# Patient Record
Sex: FEMALE | Race: WHITE | NOT HISPANIC OR LATINO | Employment: FULL TIME | URBAN - METROPOLITAN AREA
[De-identification: names, ages, dates, MRNs, and addresses within clinical notes are randomized per-mention and may not be internally consistent; named-entity substitution may affect disease eponyms.]

---

## 2023-10-21 PROBLEM — F32.A DEPRESSION: Status: ACTIVE | Noted: 2023-10-21

## 2023-10-21 PROBLEM — M54.42 ACUTE MIDLINE LOW BACK PAIN WITH BILATERAL SCIATICA: Status: ACTIVE | Noted: 2023-10-21

## 2023-10-21 PROBLEM — T78.40XA ALLERGIC REACTION TO DRUG: Status: ACTIVE | Noted: 2023-10-21

## 2023-10-21 PROBLEM — M54.41 ACUTE MIDLINE LOW BACK PAIN WITH BILATERAL SCIATICA: Status: ACTIVE | Noted: 2023-10-21

## 2023-10-21 PROBLEM — M25.69 DECREASED RANGE OF MOTION OF TRUNK AND BACK: Status: ACTIVE | Noted: 2023-10-21

## 2023-10-21 RX ORDER — SERTRALINE HYDROCHLORIDE 100 MG/1
1 TABLET, FILM COATED ORAL DAILY
COMMUNITY
Start: 2020-05-17 | End: 2023-10-24 | Stop reason: SDUPTHER

## 2023-10-21 RX ORDER — LEVONORGESTREL 52 MG/1
INTRAUTERINE DEVICE INTRAUTERINE
COMMUNITY
Start: 2019-07-26

## 2023-10-24 ENCOUNTER — OFFICE VISIT (OUTPATIENT)
Dept: OBSTETRICS AND GYNECOLOGY | Facility: CLINIC | Age: 33
End: 2023-10-24
Payer: COMMERCIAL

## 2023-10-24 VITALS
WEIGHT: 243 LBS | BODY MASS INDEX: 41.48 KG/M2 | SYSTOLIC BLOOD PRESSURE: 154 MMHG | HEIGHT: 64 IN | DIASTOLIC BLOOD PRESSURE: 80 MMHG

## 2023-10-24 DIAGNOSIS — F41.8 DEPRESSION WITH ANXIETY: Primary | ICD-10-CM

## 2023-10-24 DIAGNOSIS — Z01.419 WOMEN'S ANNUAL ROUTINE GYNECOLOGICAL EXAMINATION: ICD-10-CM

## 2023-10-24 PROCEDURE — 1036F TOBACCO NON-USER: CPT | Performed by: NURSE PRACTITIONER

## 2023-10-24 PROCEDURE — 99395 PREV VISIT EST AGE 18-39: CPT | Performed by: NURSE PRACTITIONER

## 2023-10-24 RX ORDER — SERTRALINE HYDROCHLORIDE 100 MG/1
100 TABLET, FILM COATED ORAL DAILY
Qty: 90 TABLET | Refills: 3 | Status: SHIPPED | OUTPATIENT
Start: 2023-10-24 | End: 2024-10-23

## 2023-10-24 ASSESSMENT — ENCOUNTER SYMPTOMS
ENDOCRINE NEGATIVE: 1
PSYCHIATRIC NEGATIVE: 1
CONSTITUTIONAL NEGATIVE: 1
MUSCULOSKELETAL NEGATIVE: 1
RESPIRATORY NEGATIVE: 1
CARDIOVASCULAR NEGATIVE: 1
GASTROINTESTINAL NEGATIVE: 1
EYES NEGATIVE: 1
NEUROLOGICAL NEGATIVE: 1

## 2023-10-24 NOTE — PROGRESS NOTES
Subjective   Patient ID: Criselda Lam is a 33 y.o. female who presents for Annual Exam (Reviewing EMR indicates normal PAP 6/23/21/Mirena was inserted 7/26/2019).  33 year old here for annual exam without complaints.  She had a normal pap in 2021.  She denies any need for std testing.  She denies any discharge, abnormal bleeding, urinary changes and breast complaints.  She has the mirena IUD which was inserted in 2019.  She is currently using zoloft and desires refills.  She is doing well with the current dosage and desires to continue.         Review of Systems   Constitutional: Negative.    HENT: Negative.     Eyes: Negative.    Respiratory: Negative.     Cardiovascular: Negative.    Gastrointestinal: Negative.    Endocrine: Negative.    Genitourinary: Negative.    Musculoskeletal: Negative.    Skin: Negative.    Neurological: Negative.    Psychiatric/Behavioral: Negative.         Objective   Physical Exam  Vitals reviewed.   Constitutional:       Appearance: Normal appearance. She is well-developed.   Pulmonary:      Effort: Pulmonary effort is normal. No respiratory distress.   Chest:   Breasts:     Breasts are symmetrical.      Right: Normal. No swelling, bleeding, inverted nipple, mass, nipple discharge, skin change or tenderness.      Left: Normal. No swelling, bleeding, inverted nipple, mass, nipple discharge, skin change or tenderness.   Abdominal:      Palpations: Abdomen is soft.   Genitourinary:     General: Normal vulva.      Exam position: Lithotomy position.      Pubic Area: No rash.       Labia:         Right: No rash, tenderness, lesion or injury.         Left: No rash, tenderness, lesion or injury.       Urethra: No prolapse, urethral pain, urethral swelling or urethral lesion.      Vagina: Normal.      Cervix: Normal.      Uterus: Normal.       Adnexa: Right adnexa normal and left adnexa normal.      Rectum: Normal.   Musculoskeletal:         General: Normal range of motion.   Lymphadenopathy:       Upper Body:      Right upper body: No supraclavicular, axillary or pectoral adenopathy.      Left upper body: No supraclavicular, axillary or pectoral adenopathy.   Skin:     General: Skin is warm and dry.   Neurological:      General: No focal deficit present.      Mental Status: She is alert and oriented to person, place, and time. Mental status is at baseline.   Psychiatric:         Attention and Perception: Attention and perception normal.         Mood and Affect: Mood and affect normal.         Speech: Speech normal.         Behavior: Behavior normal. Behavior is cooperative.         Thought Content: Thought content normal.         Judgment: Judgment normal.         Assessment/Plan   Problem List Items Addressed This Visit             ICD-10-CM    Women's annual routine gynecological examination Z01.419     Other Visit Diagnoses         Codes    Depression with anxiety    -  Primary F41.8    Relevant Medications    sertraline (Zoloft) 100 mg tablet          Pap/hpv due 2024  Declined std testing  Zoloft ordered with refills x 1 year  Follow up 1 year or as needed

## 2024-05-13 ENCOUNTER — APPOINTMENT (OUTPATIENT)
Dept: RADIOLOGY | Facility: HOSPITAL | Age: 34
End: 2024-05-13
Payer: COMMERCIAL

## 2024-05-13 ENCOUNTER — HOSPITAL ENCOUNTER (EMERGENCY)
Facility: HOSPITAL | Age: 34
Discharge: HOME | End: 2024-05-13
Attending: STUDENT IN AN ORGANIZED HEALTH CARE EDUCATION/TRAINING PROGRAM
Payer: COMMERCIAL

## 2024-05-13 ENCOUNTER — APPOINTMENT (OUTPATIENT)
Dept: CARDIOLOGY | Facility: HOSPITAL | Age: 34
End: 2024-05-13
Payer: COMMERCIAL

## 2024-05-13 VITALS
BODY MASS INDEX: 39.27 KG/M2 | OXYGEN SATURATION: 98 % | TEMPERATURE: 97.9 F | SYSTOLIC BLOOD PRESSURE: 138 MMHG | WEIGHT: 230 LBS | HEIGHT: 64 IN | DIASTOLIC BLOOD PRESSURE: 75 MMHG | RESPIRATION RATE: 12 BRPM | HEART RATE: 77 BPM

## 2024-05-13 DIAGNOSIS — R07.9 CHEST PAIN, UNSPECIFIED TYPE: Primary | ICD-10-CM

## 2024-05-13 LAB
ALBUMIN SERPL BCP-MCNC: 4.5 G/DL (ref 3.4–5)
ALP SERPL-CCNC: 84 U/L (ref 33–110)
ALT SERPL W P-5'-P-CCNC: 23 U/L (ref 7–45)
ANION GAP SERPL CALC-SCNC: 14 MMOL/L (ref 10–20)
AST SERPL W P-5'-P-CCNC: 20 U/L (ref 9–39)
BASOPHILS # BLD AUTO: 0.05 X10*3/UL (ref 0–0.1)
BASOPHILS NFR BLD AUTO: 0.6 %
BILIRUB SERPL-MCNC: 0.5 MG/DL (ref 0–1.2)
BUN SERPL-MCNC: 13 MG/DL (ref 6–23)
CALCIUM SERPL-MCNC: 9.6 MG/DL (ref 8.6–10.3)
CARDIAC TROPONIN I PNL SERPL HS: 3 NG/L (ref 0–13)
CHLORIDE SERPL-SCNC: 104 MMOL/L (ref 98–107)
CO2 SERPL-SCNC: 25 MMOL/L (ref 21–32)
CREAT SERPL-MCNC: 0.77 MG/DL (ref 0.5–1.05)
EGFRCR SERPLBLD CKD-EPI 2021: >90 ML/MIN/1.73M*2
EOSINOPHIL # BLD AUTO: 0.6 X10*3/UL (ref 0–0.7)
EOSINOPHIL NFR BLD AUTO: 7.6 %
ERYTHROCYTE [DISTWIDTH] IN BLOOD BY AUTOMATED COUNT: 12 % (ref 11.5–14.5)
GLUCOSE SERPL-MCNC: 104 MG/DL (ref 74–99)
HCG UR QL IA.RAPID: NEGATIVE
HCT VFR BLD AUTO: 45.8 % (ref 36–46)
HGB BLD-MCNC: 16.4 G/DL (ref 12–16)
IMM GRANULOCYTES # BLD AUTO: 0.03 X10*3/UL (ref 0–0.7)
IMM GRANULOCYTES NFR BLD AUTO: 0.4 % (ref 0–0.9)
LYMPHOCYTES # BLD AUTO: 2.43 X10*3/UL (ref 1.2–4.8)
LYMPHOCYTES NFR BLD AUTO: 30.8 %
MAGNESIUM SERPL-MCNC: 1.92 MG/DL (ref 1.6–2.4)
MCH RBC QN AUTO: 31.5 PG (ref 26–34)
MCHC RBC AUTO-ENTMCNC: 35.8 G/DL (ref 32–36)
MCV RBC AUTO: 88 FL (ref 80–100)
MONOCYTES # BLD AUTO: 0.52 X10*3/UL (ref 0.1–1)
MONOCYTES NFR BLD AUTO: 6.6 %
NEUTROPHILS # BLD AUTO: 4.27 X10*3/UL (ref 1.2–7.7)
NEUTROPHILS NFR BLD AUTO: 54 %
NRBC BLD-RTO: 0 /100 WBCS (ref 0–0)
PLATELET # BLD AUTO: 313 X10*3/UL (ref 150–450)
POTASSIUM SERPL-SCNC: 3.6 MMOL/L (ref 3.5–5.3)
PROT SERPL-MCNC: 7.9 G/DL (ref 6.4–8.2)
RBC # BLD AUTO: 5.2 X10*6/UL (ref 4–5.2)
SODIUM SERPL-SCNC: 139 MMOL/L (ref 136–145)
WBC # BLD AUTO: 7.9 X10*3/UL (ref 4.4–11.3)

## 2024-05-13 PROCEDURE — 36415 COLL VENOUS BLD VENIPUNCTURE: CPT | Performed by: STUDENT IN AN ORGANIZED HEALTH CARE EDUCATION/TRAINING PROGRAM

## 2024-05-13 PROCEDURE — 84075 ASSAY ALKALINE PHOSPHATASE: CPT | Performed by: STUDENT IN AN ORGANIZED HEALTH CARE EDUCATION/TRAINING PROGRAM

## 2024-05-13 PROCEDURE — 85025 COMPLETE CBC W/AUTO DIFF WBC: CPT | Performed by: STUDENT IN AN ORGANIZED HEALTH CARE EDUCATION/TRAINING PROGRAM

## 2024-05-13 PROCEDURE — 81025 URINE PREGNANCY TEST: CPT | Performed by: STUDENT IN AN ORGANIZED HEALTH CARE EDUCATION/TRAINING PROGRAM

## 2024-05-13 PROCEDURE — 71046 X-RAY EXAM CHEST 2 VIEWS: CPT

## 2024-05-13 PROCEDURE — 83735 ASSAY OF MAGNESIUM: CPT | Performed by: STUDENT IN AN ORGANIZED HEALTH CARE EDUCATION/TRAINING PROGRAM

## 2024-05-13 PROCEDURE — 93005 ELECTROCARDIOGRAM TRACING: CPT

## 2024-05-13 PROCEDURE — 71046 X-RAY EXAM CHEST 2 VIEWS: CPT | Mod: FOREIGN READ | Performed by: RADIOLOGY

## 2024-05-13 PROCEDURE — 99284 EMERGENCY DEPT VISIT MOD MDM: CPT | Mod: 25

## 2024-05-13 PROCEDURE — 84484 ASSAY OF TROPONIN QUANT: CPT | Performed by: STUDENT IN AN ORGANIZED HEALTH CARE EDUCATION/TRAINING PROGRAM

## 2024-05-13 ASSESSMENT — COLUMBIA-SUICIDE SEVERITY RATING SCALE - C-SSRS
1. IN THE PAST MONTH, HAVE YOU WISHED YOU WERE DEAD OR WISHED YOU COULD GO TO SLEEP AND NOT WAKE UP?: NO
2. HAVE YOU ACTUALLY HAD ANY THOUGHTS OF KILLING YOURSELF?: NO
6. HAVE YOU EVER DONE ANYTHING, STARTED TO DO ANYTHING, OR PREPARED TO DO ANYTHING TO END YOUR LIFE?: NO

## 2024-05-13 ASSESSMENT — LIFESTYLE VARIABLES
EVER FELT BAD OR GUILTY ABOUT YOUR DRINKING: NO
HAVE YOU EVER FELT YOU SHOULD CUT DOWN ON YOUR DRINKING: NO
HAVE PEOPLE ANNOYED YOU BY CRITICIZING YOUR DRINKING: NO
TOTAL SCORE: 0
EVER HAD A DRINK FIRST THING IN THE MORNING TO STEADY YOUR NERVES TO GET RID OF A HANGOVER: NO

## 2024-05-13 ASSESSMENT — PAIN SCALES - GENERAL: PAINLEVEL_OUTOF10: 0 - NO PAIN

## 2024-05-13 ASSESSMENT — PAIN - FUNCTIONAL ASSESSMENT: PAIN_FUNCTIONAL_ASSESSMENT: 0-10

## 2024-05-13 NOTE — ED PROVIDER NOTES
HPI   Chief Complaint   Patient presents with    Chest Pain    2-5 mins yesterday       HPI: Patient is a 34-year-old female, otherwise healthy, she is presenting to the emergency department today for an episode of chest pain.  The chest pain occurred yesterday Luther, May 12, while driving.  She said it lasted about 5 to 7 minutes.  She said it was a sharp pain, left side of her chest, radiating up into her shoulder.  She had associated nausea with the symptoms.  No lightheadedness or dizziness, no shortness of breath, no heart palpitations, says it resolved spontaneously.  She has never had symptoms like this before.  She has no family history of heart disease in her parents or siblings, no recent travel or surgery, she is on the Mirena and does not believe she is pregnant, no tobacco, denies any illicit drugs, denies any trauma.      ROS: Complete 12 point review of systems performed, otherwise negative except as noted in the history of present illness    PMH: Reviewed, documented below in note. Pertinents in HPI  PSH: Reviewed and documented below in note. Pertinents in HPI  SH: No tobacco, social alcohol, no illicits.  Not homeless  Fam: Reviewed, noncontributory to patients current complaint  MEDS: Reviewed and documented below in note. Pertinents in HPI  ALLERGIES: Reviewed and documented below in note.                              [place] Coma Scale Score: 15                  Patient History   Past Medical History:   Diagnosis Date    Depression     Personal history of other diseases of the musculoskeletal system and connective tissue     History of chronic back pain    Personal history of other diseases of the nervous system and sense organs     History of migraine     Past Surgical History:   Procedure Laterality Date     SECTION, LOW TRANSVERSE  6/10/19    OTHER SURGICAL HISTORY  2022     section     Family History   Problem Relation Name Age of Onset    No Known Problems Mother   "     Social History     Tobacco Use    Smoking status: Never    Smokeless tobacco: Never   Substance Use Topics    Alcohol use: Yes     Alcohol/week: 3.0 standard drinks of alcohol     Types: 3 Glasses of wine per week    Drug use: Never       Physical Exam   Visit Vitals  /86 (BP Location: Left arm, Patient Position: Lying)   Pulse 79   Temp 36.6 °C (97.9 °F)   Resp 12   Ht 1.626 m (5' 4\")   Wt 104 kg (230 lb)   SpO2 97%   BMI 39.48 kg/m²   OB Status Implant   Smoking Status Never   BSA 2.17 m²      Physical Exam  Vitals and nursing note reviewed.   Constitutional:       General: She is not in acute distress.     Appearance: Normal appearance. She is obese. She is not ill-appearing.   HENT:      Head: Normocephalic and atraumatic.   Neck:      Vascular: No carotid bruit.   Cardiovascular:      Rate and Rhythm: Normal rate and regular rhythm.      Pulses: Normal pulses.           Carotid pulses are 2+ on the right side and 2+ on the left side.       Radial pulses are 2+ on the right side and 2+ on the left side.        Dorsalis pedis pulses are 2+ on the right side and 2+ on the left side.        Posterior tibial pulses are 2+ on the right side and 2+ on the left side.      Heart sounds: Normal heart sounds. No murmur heard.  Pulmonary:      Effort: Pulmonary effort is normal. No tachypnea.      Breath sounds: Normal breath sounds.   Chest:      Chest wall: No deformity or tenderness.   Abdominal:      General: There is no distension.      Palpations: Abdomen is soft.      Tenderness: There is no abdominal tenderness. There is no guarding or rebound.   Musculoskeletal:         General: No tenderness, deformity or signs of injury.      Cervical back: Normal range of motion. No rigidity.      Right lower leg: No tenderness. No edema.      Left lower leg: No tenderness. No edema.   Skin:     General: Skin is warm and dry.      Capillary Refill: Capillary refill takes less than 2 seconds.   Neurological:      " General: No focal deficit present.      Mental Status: She is alert and oriented to person, place, and time.      Sensory: No sensory deficit.      Motor: No weakness.   Psychiatric:         Mood and Affect: Mood normal.         Behavior: Behavior normal.         XR chest 2 views   Final Result   No acute disease.   Signed by Kolby Osullivan          Labs Reviewed   CBC WITH AUTO DIFFERENTIAL - Abnormal       Result Value    WBC 7.9      nRBC 0.0      RBC 5.20      Hemoglobin 16.4 (*)     Hematocrit 45.8      MCV 88      MCH 31.5      MCHC 35.8      RDW 12.0      Platelets 313      Neutrophils % 54.0      Immature Granulocytes %, Automated 0.4      Lymphocytes % 30.8      Monocytes % 6.6      Eosinophils % 7.6      Basophils % 0.6      Neutrophils Absolute 4.27      Immature Granulocytes Absolute, Automated 0.03      Lymphocytes Absolute 2.43      Monocytes Absolute 0.52      Eosinophils Absolute 0.60      Basophils Absolute 0.05     COMPREHENSIVE METABOLIC PANEL - Abnormal    Glucose 104 (*)     Sodium 139      Potassium 3.6      Chloride 104      Bicarbonate 25      Anion Gap 14      Urea Nitrogen 13      Creatinine 0.77      eGFR >90      Calcium 9.6      Albumin 4.5      Alkaline Phosphatase 84      Total Protein 7.9      AST 20      Bilirubin, Total 0.5      ALT 23     MAGNESIUM - Normal    Magnesium 1.92     HCG, URINE, QUALITATIVE - Normal    HCG, Urine NEGATIVE     TROPONIN I, HIGH SENSITIVITY - Normal    Troponin I, High Sensitivity 3      Narrative:     Less than 99th percentile of normal range cutoff-  Female and children under 18 years old <14 ng/L; Male <21 ng/L: Negative  Repeat testing should be performed if clinically indicated.     Female and children under 18 years old 14-50 ng/L; Male 21-50 ng/L:  Consistent with possible cardiac damage and possible increased clinical   risk. Serial measurements may help to assess extent of myocardial damage.     >50 ng/L: Consistent with cardiac damage, increased  clinical risk and  myocardial infarction. Serial measurements may help assess extent of   myocardial damage.      NOTE: Children less than 1 year old may have higher baseline troponin   levels and results should be interpreted in conjunction with the overall   clinical context.     NOTE: Troponin I testing is performed using a different   testing methodology at [facility] than at other   [facility] hospitals. Direct result comparisons should only   be made within the same method.         ED Course & MDM   ED Course as of 05/13/24 1451   Mon May 13, 2024   1230 EKG is interpreted by myself demonstrates sinus rhythm with a ventricular rate of 89, normal axis, normal intervals, no evidence of an acute STEMI or malignant arrhythmia. [NS]      ED Course User Index  [NS] Bang Daniel MD         Diagnoses as of 05/13/24 1451   Chest pain, unspecified type           Medical Decision Making  All mentioned lab results, ECGs, and imaging were independently reviewed by myself  - Patient evaluated. Patient is presenting to the emergency department today for chest pain, resolved spontaneously.  Differential for the patient's pain includes but is not limited to potential ACS, Prinzmetal angina, pericarditis, GERD or a panic attack, musculoskeletal strain, pinched nerve, or precordial catch syndrome to name a few.  EKGs as well as labs and imaging were obtained.  The patient is asymptomatic currently.  Troponin is negative, electrolytes are grossly unremarkable, some hemoconcentration is noted, chest x-ray with no acute pathology.  On repeat evaluation she remains asymptomatic.  She has a heart score of 1.  I feel she is stable for discharge with close outpatient follow-up to her primary care physician.  All questions were answered.  She was discharged otherwise stable condition    - Monitored for any changes in stability or symptomatology. Patient remained stable.   - Counseled regarding labs, imaging,  diagnosis, and plan. Patient was agreeable. All questions were answered. The patient was receptive and agreeable to the plan of care.   -The patient was instructed to return to the emergency department if any symptoms recurred, worsened, or if there were any additional concerns.    *Disclaimer: This note was dictated by speech recognition. Minor errors in transcription may be present. Please call with questions.    David Daniel MD             Your medication list        ASK your doctor about these medications        Instructions Last Dose Given Next Dose Due   Mirena 21 mcg/24 hours (8 yrs) 52 mg IUD  Generic drug: levonorgestrel           sertraline 100 mg tablet  Commonly known as: Zoloft      Take 1 tablet (100 mg) by mouth once daily.                Procedure  Procedures     *This report was transcribed using voice recognition software.  Every effort was made to ensure accuracy; however, inadvertent computerized transcription errors may be present.*  Bang Daniel MD  05/13/24         Bang Daniel MD  05/13/24 [id number]

## 2024-05-13 NOTE — ED TRIAGE NOTES
Pt states that yesterday at 2pm she felt CP that felt like squeezing, and her teeth hurt. Pt states that her pain last lasted only 2-5 mins and hasn't felt pain since

## 2024-05-22 LAB
ATRIAL RATE: 88 BPM
P AXIS: 40 DEGREES
PR INTERVAL: 136 MS
Q ONSET: 251 MS
QRS COUNT: 14 BEATS
QRS DURATION: 90 MS
QT INTERVAL: 359 MS
QTC CALCULATION(BAZETT): 437 MS
QTC FREDERICIA: 409 MS
R AXIS: 42 DEGREES
T AXIS: 13 DEGREES
T OFFSET: 431 MS
VENTRICULAR RATE: 89 BPM

## 2024-09-24 ENCOUNTER — OFFICE VISIT (OUTPATIENT)
Dept: PRIMARY CARE | Facility: CLINIC | Age: 34
End: 2024-09-24
Payer: COMMERCIAL

## 2024-09-24 VITALS
BODY MASS INDEX: 41.48 KG/M2 | HEIGHT: 64 IN | WEIGHT: 243 LBS | HEART RATE: 106 BPM | SYSTOLIC BLOOD PRESSURE: 124 MMHG | DIASTOLIC BLOOD PRESSURE: 78 MMHG

## 2024-09-24 DIAGNOSIS — R21 RASH AND NONSPECIFIC SKIN ERUPTION: Primary | ICD-10-CM

## 2024-09-24 PROCEDURE — 1036F TOBACCO NON-USER: CPT | Performed by: STUDENT IN AN ORGANIZED HEALTH CARE EDUCATION/TRAINING PROGRAM

## 2024-09-24 PROCEDURE — 3008F BODY MASS INDEX DOCD: CPT | Performed by: STUDENT IN AN ORGANIZED HEALTH CARE EDUCATION/TRAINING PROGRAM

## 2024-09-24 PROCEDURE — 99213 OFFICE O/P EST LOW 20 MIN: CPT | Performed by: STUDENT IN AN ORGANIZED HEALTH CARE EDUCATION/TRAINING PROGRAM

## 2024-09-24 RX ORDER — METHYLPREDNISOLONE 4 MG/1
TABLET ORAL
Qty: 21 TABLET | Refills: 0 | Status: SHIPPED | OUTPATIENT
Start: 2024-09-24 | End: 2024-10-01

## 2024-09-24 ASSESSMENT — PATIENT HEALTH QUESTIONNAIRE - PHQ9
1. LITTLE INTEREST OR PLEASURE IN DOING THINGS: NOT AT ALL
SUM OF ALL RESPONSES TO PHQ9 QUESTIONS 1 AND 2: 0
2. FEELING DOWN, DEPRESSED OR HOPELESS: NOT AT ALL

## 2024-09-24 NOTE — PROGRESS NOTES
"Criselda Lam is a 34 y.o. year old female presenting for Rash       HPI:  Rash started two days ago. She was put on doxycycline 6 days ago for a sinus infection. The rash is red bumps on her arms and chest that are very itchy. Had a similar rash with amoxicillin. Only has one day of doxy left. Her sinus symptoms have improved. No new respiratory symptoms. No other changes recently.    ROS:   All pertinent positive symptoms are included in history of present illness.    All other systems have been reviewed and are negative and noncontributory to this patient's current ailments.    Current Outpatient Medications   Medication Sig Dispense Refill    levonorgestrel (Mirena) 21 mcg/24 hours (8 yrs) 52 mg IUD by intrauterine route.      methylPREDNISolone (Medrol Dospak) 4 mg tablets Take as directed on package. 21 tablet 0    sertraline (Zoloft) 100 mg tablet Take 1 tablet (100 mg) by mouth once daily. 90 tablet 3     No current facility-administered medications for this visit.       OBJECTIVE  Visit Vitals  /78   Pulse 106   Ht 1.626 m (5' 4\")   Wt 110 kg (243 lb)   BMI 41.71 kg/m²   OB Status Implant   Smoking Status Never   BSA 2.23 m²        Physical Exam:  GENERAL: Alert, oriented, pleasant, in no acute distress  HEENT: Head normocephalic, atraumatic  EXTREMITIES: no edema, no cyanosis  PSYCH: Appropriate mood and affect  SKIN: small, erythematous papular rash on bilateral forearms and chest    Assessment/Plan   Diagnoses and all orders for this visit:  Rash and nonspecific skin eruption  -     methylPREDNISolone (Medrol Dospak) 4 mg tablets; Take as directed on package.  Stop the doxy. Medrol dospak sent which will help her sinus symptoms as well. If rash does not resolve, she should contact the office.         "

## 2024-10-28 ENCOUNTER — APPOINTMENT (OUTPATIENT)
Dept: OBSTETRICS AND GYNECOLOGY | Facility: CLINIC | Age: 34
End: 2024-10-28
Payer: COMMERCIAL

## 2024-10-28 VITALS — HEIGHT: 64 IN | WEIGHT: 241 LBS | BODY MASS INDEX: 41.15 KG/M2

## 2024-10-28 DIAGNOSIS — Z01.419 WOMEN'S ANNUAL ROUTINE GYNECOLOGICAL EXAMINATION: Primary | ICD-10-CM

## 2024-10-28 DIAGNOSIS — Z11.51 SCREENING FOR HUMAN PAPILLOMAVIRUS (HPV): ICD-10-CM

## 2024-10-28 DIAGNOSIS — Z12.4 PAP SMEAR FOR CERVICAL CANCER SCREENING: ICD-10-CM

## 2024-10-28 PROCEDURE — 88175 CYTOPATH C/V AUTO FLUID REDO: CPT

## 2024-10-28 PROCEDURE — 1036F TOBACCO NON-USER: CPT | Performed by: NURSE PRACTITIONER

## 2024-10-28 PROCEDURE — 99395 PREV VISIT EST AGE 18-39: CPT | Performed by: NURSE PRACTITIONER

## 2024-10-28 PROCEDURE — 3008F BODY MASS INDEX DOCD: CPT | Performed by: NURSE PRACTITIONER

## 2024-10-28 PROCEDURE — 87624 HPV HI-RISK TYP POOLED RSLT: CPT

## 2024-10-28 ASSESSMENT — ENCOUNTER SYMPTOMS
CONSTITUTIONAL NEGATIVE: 1
NEUROLOGICAL NEGATIVE: 1
RESPIRATORY NEGATIVE: 1
ENDOCRINE NEGATIVE: 1
CARDIOVASCULAR NEGATIVE: 1
EYES NEGATIVE: 1
MUSCULOSKELETAL NEGATIVE: 1
PSYCHIATRIC NEGATIVE: 1
GASTROINTESTINAL NEGATIVE: 1

## 2024-11-04 DIAGNOSIS — F41.8 DEPRESSION WITH ANXIETY: ICD-10-CM

## 2024-11-05 RX ORDER — SERTRALINE HYDROCHLORIDE 100 MG/1
100 TABLET, FILM COATED ORAL DAILY
Qty: 90 TABLET | Refills: 0 | Status: SHIPPED | OUTPATIENT
Start: 2024-11-05

## 2024-11-07 ENCOUNTER — OFFICE VISIT (OUTPATIENT)
Dept: URGENT CARE | Age: 34
End: 2024-11-07
Payer: COMMERCIAL

## 2024-11-07 VITALS
SYSTOLIC BLOOD PRESSURE: 131 MMHG | DIASTOLIC BLOOD PRESSURE: 83 MMHG | OXYGEN SATURATION: 98 % | HEART RATE: 88 BPM | TEMPERATURE: 98.7 F

## 2024-11-07 DIAGNOSIS — J01.10 ACUTE NON-RECURRENT FRONTAL SINUSITIS: ICD-10-CM

## 2024-11-07 DIAGNOSIS — J02.9 SORE THROAT: ICD-10-CM

## 2024-11-07 DIAGNOSIS — J02.9 ACUTE PHARYNGITIS, UNSPECIFIED ETIOLOGY: ICD-10-CM

## 2024-11-07 DIAGNOSIS — R43.2 LOSS OF TASTE: Primary | ICD-10-CM

## 2024-11-07 LAB — POC SARS-COV-2 AG BINAX: NORMAL

## 2024-11-07 PROCEDURE — 87811 SARS-COV-2 COVID19 W/OPTIC: CPT | Performed by: NURSE PRACTITIONER

## 2024-11-07 PROCEDURE — 99203 OFFICE O/P NEW LOW 30 MIN: CPT | Performed by: NURSE PRACTITIONER

## 2024-11-07 RX ORDER — CEFDINIR 300 MG/1
300 CAPSULE ORAL 2 TIMES DAILY
Qty: 20 CAPSULE | Refills: 0 | Status: SHIPPED | OUTPATIENT
Start: 2024-11-07 | End: 2024-11-17

## 2024-11-07 NOTE — PROGRESS NOTES
SUBJECTIVE:   Criselda Lam is a 34 y.o. female who complains of congestion, nasal blockage, post nasal drip, productive cough, and headache for 7 days. She denies a history of shortness of breath and weakness and denies a history of asthma. Patient denies smoke cigarettes.     OBJECTIVE:  She appears well, vital signs are as noted. Ears normal.  Throat and pharynx normal.  Neck supple. No adenopathy in the neck. Nose is congested. Sinuses non tender. The chest is clear, without wheezes or rales.    ASSESSMENT:   sinusitis    PLAN:  Symptomatic therapy suggested: push fluids, rest, use vaporizer or mist prn, and return office visit prn if symptoms persist or worsen. Lack of antibiotic effectiveness discussed with her. Call or return to clinic prn if these symptoms worsen or fail to improve as anticipated.

## 2024-11-07 NOTE — PATIENT INSTRUCTIONS
History and examination consistent with acute uncomplicated sinusitis. No  indication for labs or imaging at this time. No evidence of sepsis, strep pharyngitis, or  pneumonia. Counseled patient/family on antibiotic treatment and supportive measures at  home. Patient advised to return to clinic or present to ED if symptoms change or worsen.  Otherwise follow-up with PCP.   Doxycyline allergy, taking Zoloft that has increased risk of prolonged QT interval when combined with Azithromycin, has taken cefidnir in the past without difficulty

## 2024-11-08 LAB
CYTOLOGY CMNT CVX/VAG CYTO-IMP: NORMAL
HPV HR 12 DNA GENITAL QL NAA+PROBE: NEGATIVE
HPV HR GENOTYPES PNL CVX NAA+PROBE: NEGATIVE
HPV16 DNA SPEC QL NAA+PROBE: NEGATIVE
HPV18 DNA SPEC QL NAA+PROBE: NEGATIVE
LAB AP HPV GENOTYPE QUESTION: YES
LAB AP HPV HR: NORMAL
LABORATORY COMMENT REPORT: NORMAL
PATH REPORT.TOTAL CANCER: NORMAL

## 2025-02-01 DIAGNOSIS — F41.8 DEPRESSION WITH ANXIETY: ICD-10-CM

## 2025-02-03 RX ORDER — SERTRALINE HYDROCHLORIDE 100 MG/1
100 TABLET, FILM COATED ORAL DAILY
Qty: 90 TABLET | Refills: 0 | Status: SHIPPED | OUTPATIENT
Start: 2025-02-03

## 2025-04-02 ENCOUNTER — OFFICE VISIT (OUTPATIENT)
Dept: PRIMARY CARE | Facility: CLINIC | Age: 35
End: 2025-04-02
Payer: COMMERCIAL

## 2025-04-02 ENCOUNTER — HOSPITAL ENCOUNTER (OUTPATIENT)
Dept: RADIOLOGY | Facility: CLINIC | Age: 35
Discharge: HOME | End: 2025-04-02
Payer: COMMERCIAL

## 2025-04-02 VITALS
DIASTOLIC BLOOD PRESSURE: 70 MMHG | HEART RATE: 83 BPM | TEMPERATURE: 97.5 F | SYSTOLIC BLOOD PRESSURE: 122 MMHG | OXYGEN SATURATION: 96 %

## 2025-04-02 DIAGNOSIS — J40 BRONCHITIS: ICD-10-CM

## 2025-04-02 DIAGNOSIS — J40 BRONCHITIS: Primary | ICD-10-CM

## 2025-04-02 PROCEDURE — 71046 X-RAY EXAM CHEST 2 VIEWS: CPT | Performed by: RADIOLOGY

## 2025-04-02 PROCEDURE — 1036F TOBACCO NON-USER: CPT | Performed by: STUDENT IN AN ORGANIZED HEALTH CARE EDUCATION/TRAINING PROGRAM

## 2025-04-02 PROCEDURE — 71046 X-RAY EXAM CHEST 2 VIEWS: CPT

## 2025-04-02 PROCEDURE — 99214 OFFICE O/P EST MOD 30 MIN: CPT | Performed by: STUDENT IN AN ORGANIZED HEALTH CARE EDUCATION/TRAINING PROGRAM

## 2025-04-02 RX ORDER — ALBUTEROL SULFATE 90 UG/1
2 INHALANT RESPIRATORY (INHALATION) EVERY 6 HOURS
COMMUNITY
Start: 2025-03-31

## 2025-04-02 RX ORDER — BUDESONIDE AND FORMOTEROL FUMARATE DIHYDRATE 160; 4.5 UG/1; UG/1
2 AEROSOL RESPIRATORY (INHALATION)
Qty: 10.2 G | Refills: 5 | Status: SHIPPED | OUTPATIENT
Start: 2025-04-02 | End: 2026-04-02

## 2025-04-02 RX ORDER — AZITHROMYCIN 500 MG/1
500 TABLET, FILM COATED ORAL DAILY
Qty: 5 TABLET | Refills: 0 | Status: SHIPPED | OUTPATIENT
Start: 2025-04-02 | End: 2025-04-07

## 2025-04-02 RX ORDER — PREDNISONE 20 MG/1
40 TABLET ORAL DAILY
COMMUNITY

## 2025-04-02 ASSESSMENT — PATIENT HEALTH QUESTIONNAIRE - PHQ9
1. LITTLE INTEREST OR PLEASURE IN DOING THINGS: NOT AT ALL
2. FEELING DOWN, DEPRESSED OR HOPELESS: NOT AT ALL
SUM OF ALL RESPONSES TO PHQ9 QUESTIONS 1 AND 2: 0

## 2025-04-02 NOTE — PROGRESS NOTES
Subjective   Patient ID: Criselda Lam is a 34 y.o. female who presents for Bronchitis.    HPI    Patient is presenting to follow-up with bronchitis.  Woke up on  with lightheadedness, dizzy, shortness of breath and had to go to  the emergency room. Went to [facility]  Continues to have symptoms with minimal improvement  Continues to have a cough, fatigue, chest tightness, some shortness of breath   Last fever was on Monday, 3-4 fevers total  X Ray looked clear at that time  Had diffuse wheezing at that time  While in the ER received a breathing treatment, IV steroids and fluids  Was discharged with albuterol, prednisone 40 mg x 5 days, today is day 3  In addition continues to take tylenol, Dayquil, robitussin   Symptoms originally started 1 week ago, her son has severe asthma and was hospitalized last week      Review of Systems  All pertinent positive symptoms are included in the history of present illness.    All other systems have been reviewed and are negative and noncontributory to this patient's current ailments.     Social History     Tobacco Use    Smoking status: Never    Smokeless tobacco: Never   Substance Use Topics    Alcohol use: Yes     Alcohol/week: 3.0 standard drinks of alcohol     Types: 3 Glasses of wine per week      Past Surgical History:   Procedure Laterality Date     SECTION, LOW TRANSVERSE  6/10/19    OTHER SURGICAL HISTORY  2022     section      Allergies   Allergen Reactions    Doxycycline Hives    Penicillins Hives        Current Outpatient Medications   Medication Sig Dispense Refill    albuterol 90 mcg/actuation inhaler Inhale 2 puffs every 6 hours.      azithromycin (Zithromax) 500 mg tablet Take 1 tablet (500 mg) by mouth once daily for 5 days. 5 tablet 0    budesonide-formoterol (Symbicort) 160-4.5 mcg/actuation inhaler Inhale 2 puffs 2 times a day. Rinse mouth with water after use to reduce aftertaste and incidence of candidiasis. Do not  swallow. 10.2 g 5    levonorgestrel (Mirena) 21 mcg/24 hours (8 yrs) 52 mg IUD by intrauterine route.      predniSONE (Deltasone) 20 mg tablet Take 2 tablets (40 mg) by mouth once daily.      sertraline (Zoloft) 100 mg tablet TAKE 1 TABLET BY MOUTH ONCE DAILY. 90 tablet 0     No current facility-administered medications for this visit.        Patient Active Problem List   Diagnosis    Allergic reaction to drug    Decreased range of motion of trunk and back    Depression    Acute midline low back pain with bilateral sciatica    Women's annual routine gynecological examination      Immunization History   Administered Date(s) Administered    COVID-19, mRNA, LNP-S, PF, 30 mcg/0.3 mL dose 02/03/2021, 02/24/2021    Flu vaccine (IIV4), preservative free *Check age/dose* 10/23/2020, 11/11/2021    Influenza, seasonal, injectable 10/24/2018    Tdap vaccine, age 7 year and older (BOOSTRIX, ADACEL) 03/22/2019       Objective   VITALS  /70 (BP Location: Left arm, Patient Position: Sitting, BP Cuff Size: Adult)   Pulse 83   Temp 36.4 °C (97.5 °F) (Oral)   SpO2 96%      Physical Exam  Constitutional:       Appearance: Normal appearance.   HENT:      Head: Normocephalic and atraumatic.      Right Ear: External ear normal.      Left Ear: External ear normal.      Nose: Nose normal.   Eyes:      Extraocular Movements: Extraocular movements intact.      Conjunctiva/sclera: Conjunctivae normal.   Cardiovascular:      Rate and Rhythm: Normal rate and regular rhythm.      Heart sounds: Normal heart sounds.   Pulmonary:      Breath sounds: Decreased air movement present. Examination of the right-lower field reveals decreased breath sounds. Examination of the left-lower field reveals decreased breath sounds. Decreased breath sounds present.   Skin:     General: Skin is warm.   Neurological:      General: No focal deficit present.      Mental Status: She is alert.   Psychiatric:         Mood and Affect: Mood normal.          Behavior: Behavior normal.       No results found for this or any previous visit (from the past 12 weeks).     Assessment/Plan   Diagnoses and all orders for this visit:  Bronchitis  A requisition for chest x-ray has been placed.  Please schedule this at your earliest convenience.  Will notify of results and treatment recommendations accordingly.  In addition we have also sent a prescription for Symbicort to your pharmacy to help with your chest tightness.  We have also sent a prescription for azithromycin 5 mg x 5 days to help with bronchitis.   Risks, benefits, and options of treatment(s) were discussed after reviewing all current medication(s) and drug allergy(ies)  I opted for the treatment that we discussed with instructions on the medication use for your underlying medical ailment(s)  I encouraged supportive care such as rest, fluids and Advil/Tylenol as warranted  Return to the clinic in 7-10 days or sooner if symptoms worsen or persist as we will then further evaluate  If he started develop shortness of breath, difficulty breathing, please go to the emergency room immediately for further evaluation.  -     budesonide-formoterol (Symbicort) 160-4.5 mcg/actuation inhaler; Inhale 2 puffs 2 times a day. Rinse mouth with water after use to reduce aftertaste and incidence of candidiasis. Do not swallow.  -     azithromycin (Zithromax) 500 mg tablet; Take 1 tablet (500 mg) by mouth once daily for 5 days.  -     XR chest 2 views; Future

## 2025-04-21 DIAGNOSIS — F41.8 DEPRESSION WITH ANXIETY: ICD-10-CM

## 2025-04-21 RX ORDER — SERTRALINE HYDROCHLORIDE 100 MG/1
100 TABLET, FILM COATED ORAL DAILY
Qty: 90 TABLET | Refills: 2 | Status: SHIPPED | OUTPATIENT
Start: 2025-04-21 | End: 2026-04-21

## 2025-06-05 ENCOUNTER — OFFICE VISIT (OUTPATIENT)
Dept: URGENT CARE | Age: 35
End: 2025-06-05
Payer: COMMERCIAL

## 2025-06-05 VITALS
SYSTOLIC BLOOD PRESSURE: 116 MMHG | OXYGEN SATURATION: 97 % | HEART RATE: 85 BPM | TEMPERATURE: 98.6 F | DIASTOLIC BLOOD PRESSURE: 84 MMHG

## 2025-06-05 DIAGNOSIS — B37.89 CANDIDA LARYNGITIS: Primary | ICD-10-CM

## 2025-06-05 DIAGNOSIS — J02.9 SORE THROAT: ICD-10-CM

## 2025-06-05 LAB
POC HUMAN RHINOVIRUS PCR: NEGATIVE
POC INFLUENZA A VIRUS PCR: NEGATIVE
POC INFLUENZA B VIRUS PCR: NEGATIVE
POC RESPIRATORY SYNCYTIAL VIRUS PCR: NEGATIVE
POC STREPTOCOCCUS PYOGENES (GROUP A STREP) PCR: NEGATIVE

## 2025-06-05 RX ORDER — NYSTATIN 100000 [USP'U]/ML
5 SUSPENSION ORAL 4 TIMES DAILY
Qty: 200 ML | Refills: 0 | Status: SHIPPED | OUTPATIENT
Start: 2025-06-05 | End: 2025-06-15

## 2025-06-05 NOTE — PROGRESS NOTES
Subjective   Patient ID: Criselda Lam is a 35 y.o. female. They present today with a chief complaint of Illness (Sore throat. 2 days.).    History of Present Illness  C/O sore throat x 2 days, white spot in back of throat.  Has been taking Symbicort d/t bronchitis.  Started in April, thinks she has been rinsing mouth.  No fever chills or other symptoms      Illness      Past Medical History  Allergies as of 06/05/2025 - Reviewed 04/02/2025   Allergen Reaction Noted    Doxycycline Hives 10/28/2024    Penicillins Hives 10/21/2023       Prescriptions Prior to Admission[1]     Medical History[2]    Surgical History[3]     reports that she has never smoked. She has never used smokeless tobacco. She reports current alcohol use of about 3.0 standard drinks of alcohol per week. She reports that she does not use drugs.    Review of Systems  Review of Systems See HPI                               Objective    Vitals:    06/05/25 0950   BP: 116/84   Pulse: 85   Temp: 37 °C (98.6 °F)   SpO2: 97%     No LMP recorded. Patient has had an implant.    Physical Exam  Constitutional:       Appearance: Normal appearance.   HENT:      Right Ear: Tympanic membrane, ear canal and external ear normal.      Left Ear: Tympanic membrane, ear canal and external ear normal.      Mouth/Throat:      Pharynx: Oropharyngeal exudate (white exudate posterior pharynx) and posterior oropharyngeal erythema present.   Cardiovascular:      Rate and Rhythm: Normal rate and regular rhythm.   Pulmonary:      Effort: Pulmonary effort is normal.      Breath sounds: Normal breath sounds.   Musculoskeletal:         General: Normal range of motion.   Skin:     General: Skin is warm and dry.   Neurological:      General: No focal deficit present.      Mental Status: She is alert and oriented to person, place, and time.   Psychiatric:         Mood and Affect: Mood normal.         Behavior: Behavior normal.         Thought Content: Thought content normal.          Judgment: Judgment normal.         Procedures    Point of Care Test & Imaging Results from this visit  Results for orders placed or performed in visit on 25   POCT SPOTFIRE R/ST Panel Mini w/Strep A ([employer]) manually resulted   Result Value Ref Range    POC Group A Strep, PCR Negative Negative    POC Respiratory Syncytial Virus PCR Negative Negative    POC Influenza A Virus PCR Negative Negative    POC Influenza B Virus PCR Negative Negative    POC Human Rhinovirus PCR Negative Negative      Imaging  No results found.    Cardiology, Vascular, and Other Imaging  No other imaging results found for the past 2 days      Diagnostic study results (if any) were reviewed by BALAJI Thomson.    Assessment/Plan   Allergies, medications, history, and pertinent labs/EKGs/Imaging reviewed by BALAJI Thomson.     Medical Decision Making  History and exam consistent with Candida infection of throat  Discussed the importance of rinsing mouth after every use of Symbicort  Treating with Nystatin swish and swallow  Follow up with PCP if not improving    Orders and Diagnoses  Diagnoses and all orders for this visit:  Candida laryngitis  -     nystatin (Mycostatin) 100,000 unit/mL suspension; Swish and swallow 5 mL (500,000 Units) 4 times a day for 10 days.  Sore throat  -     POCT SPOTFIRE R/ST Panel Mini w/Strep A ([employer]) manually resulted      Medical Admin Record      Patient disposition: Home    Electronically signed by BALAJI Thomson  10:28 AM           [1] (Not in a hospital admission)  [2]   Past Medical History:  Diagnosis Date    Depression     Personal history of other diseases of the musculoskeletal system and connective tissue     History of chronic back pain    Personal history of other diseases of the nervous system and sense organs     History of migraine   [3]   Past Surgical History:  Procedure Laterality Date     SECTION, LOW TRANSVERSE  6/10/19    OTHER SURGICAL  HISTORY  2022     section

## 2025-06-05 NOTE — PATIENT INSTRUCTIONS
History and exam consistent with Candida infection of throat  Discussed the importance of rinsing mouth after every use of Symbicort  Treating with Nystatin swish and swallow  Follow up with PCP if not improving

## 2025-11-03 ENCOUNTER — APPOINTMENT (OUTPATIENT)
Dept: OBSTETRICS AND GYNECOLOGY | Facility: CLINIC | Age: 35
End: 2025-11-03
Payer: COMMERCIAL